# Patient Record
Sex: MALE | Race: WHITE | Employment: FULL TIME | URBAN - NONMETROPOLITAN AREA
[De-identification: names, ages, dates, MRNs, and addresses within clinical notes are randomized per-mention and may not be internally consistent; named-entity substitution may affect disease eponyms.]

---

## 2019-02-06 ENCOUNTER — OFFICE VISIT (OUTPATIENT)
Dept: PSYCHIATRY | Age: 39
End: 2019-02-06
Payer: COMMERCIAL

## 2019-02-06 DIAGNOSIS — F90.2 ATTENTION DEFICIT HYPERACTIVITY DISORDER (ADHD), COMBINED TYPE: Primary | ICD-10-CM

## 2019-02-06 PROCEDURE — 90792 PSYCH DIAG EVAL W/MED SRVCS: CPT | Performed by: PSYCHIATRY & NEUROLOGY

## 2019-02-06 RX ORDER — LEVOCETIRIZINE DIHYDROCHLORIDE 5 MG/1
5 TABLET, FILM COATED ORAL NIGHTLY
COMMUNITY

## 2019-02-06 RX ORDER — M-VIT,TX,IRON,MINS/CALC/FOLIC 27MG-0.4MG
1 TABLET ORAL DAILY
COMMUNITY

## 2019-02-06 RX ORDER — ATOMOXETINE 40 MG/1
80 CAPSULE ORAL DAILY
Qty: 60 CAPSULE | Refills: 2 | Status: SHIPPED | OUTPATIENT
Start: 2019-02-06 | End: 2019-04-29

## 2019-02-20 ENCOUNTER — OFFICE VISIT (OUTPATIENT)
Dept: PSYCHIATRY | Age: 39
End: 2019-02-20
Payer: COMMERCIAL

## 2019-02-20 DIAGNOSIS — F90.0 ATTENTION DEFICIT HYPERACTIVITY DISORDER (ADHD), PREDOMINANTLY INATTENTIVE TYPE: ICD-10-CM

## 2019-02-20 PROBLEM — F90.9 ADHD: Status: ACTIVE | Noted: 2019-02-20

## 2019-02-20 PROCEDURE — 99212 OFFICE O/P EST SF 10 MIN: CPT | Performed by: PSYCHIATRY & NEUROLOGY

## 2019-03-19 ENCOUNTER — OFFICE VISIT (OUTPATIENT)
Dept: PSYCHIATRY | Age: 39
End: 2019-03-19
Payer: COMMERCIAL

## 2019-03-19 DIAGNOSIS — F90.0 ATTENTION DEFICIT HYPERACTIVITY DISORDER (ADHD), PREDOMINANTLY INATTENTIVE TYPE: Primary | ICD-10-CM

## 2019-03-19 PROCEDURE — 99213 OFFICE O/P EST LOW 20 MIN: CPT | Performed by: PSYCHIATRY & NEUROLOGY

## 2019-03-19 RX ORDER — DEXTROAMPHETAMINE SACCHARATE, AMPHETAMINE ASPARTATE, DEXTROAMPHETAMINE SULFATE AND AMPHETAMINE SULFATE 2.5; 2.5; 2.5; 2.5 MG/1; MG/1; MG/1; MG/1
10 TABLET ORAL 2 TIMES DAILY
Qty: 60 TABLET | Refills: 0 | Status: SHIPPED | OUTPATIENT
Start: 2019-03-19 | End: 2019-04-02 | Stop reason: DRUGHIGH

## 2019-04-02 ENCOUNTER — OFFICE VISIT (OUTPATIENT)
Dept: PSYCHIATRY | Age: 39
End: 2019-04-02
Payer: COMMERCIAL

## 2019-04-02 DIAGNOSIS — F90.0 ATTENTION DEFICIT HYPERACTIVITY DISORDER (ADHD), PREDOMINANTLY INATTENTIVE TYPE: ICD-10-CM

## 2019-04-02 PROCEDURE — 99212 OFFICE O/P EST SF 10 MIN: CPT | Performed by: PSYCHIATRY & NEUROLOGY

## 2019-04-02 RX ORDER — DEXTROAMPHETAMINE SACCHARATE, AMPHETAMINE ASPARTATE, DEXTROAMPHETAMINE SULFATE AND AMPHETAMINE SULFATE 5; 5; 5; 5 MG/1; MG/1; MG/1; MG/1
20 TABLET ORAL 2 TIMES DAILY
Qty: 60 TABLET | Refills: 0 | Status: SHIPPED | OUTPATIENT
Start: 2019-04-02 | End: 2019-05-08 | Stop reason: SINTOL

## 2019-04-02 NOTE — PROGRESS NOTES
PSYCHIATRIC FOLLOW UP NOTE     PATIENT NAME: Ayush Montano     : 1980   DATE of VISIT: 2019   No chief complaint on file. Interval History: Today I met with Mr. Carmella Motta in follow up. He discontinued atomoxetine and started adderall. He had no discontinuation symptoms from atomoxetine and notes no effects from the adderall, which he started a week ago. Current meds:   Current Outpatient Medications   Medication Sig Dispense Refill    amphetamine-dextroamphetamine (ADDERALL, 20MG,) 20 MG tablet Take 1 tablet by mouth 2 times daily for 30 days. 60 tablet 0    vitamin D (CHOLECALCIFEROL) 1000 UNIT TABS tablet Take 1,000 Units by mouth daily      Multiple Vitamins-Minerals (THERAPEUTIC MULTIVITAMIN-MINERALS) tablet Take 1 tablet by mouth daily      levocetirizine (XYZAL) 5 MG tablet Take 5 mg by mouth nightly      atomoxetine (STRATTERA) 40 MG capsule Take 2 capsules by mouth daily 60 capsule 2     No current facility-administered medications for this visit. Mental Status Exam:   Appearance: Neatly groomed, appropriately dressed   Gait:wnl  Behavior: Cooperative and Appropriate   Eye Contact: Maintained good eye contact   Psychomotor Activity: Normal   Speech: Rate, volume, and prosody were normal   Mood: Ok   Affect: Full range, appropriate, and mood congruent   Thought Process: Goal directed and coherent   Associations: Goal directed and coherent   Thought content: The thought content was appropriate to questions. The patient denies any suicidal ideation, homicidal ideation, or paranoid ideation.  There are no signs of psychosis or symptoms of mood disorder   Perceptions: Perceptions were normal, the patient denied any auditory, tactile or visual hallucinations   Cognition: Patient is alert and oriented to time, place and person, no gross cognitive deficits   Memory:intact  Attention:fair  Judgment: Judgment appeared normal and appropri ate   Insight: Good insight into illness

## 2019-04-29 ENCOUNTER — OFFICE VISIT (OUTPATIENT)
Dept: PSYCHIATRY | Age: 39
End: 2019-04-29
Payer: COMMERCIAL

## 2019-04-29 DIAGNOSIS — F90.0 ATTENTION DEFICIT HYPERACTIVITY DISORDER (ADHD), PREDOMINANTLY INATTENTIVE TYPE: Primary | ICD-10-CM

## 2019-04-29 PROCEDURE — 99212 OFFICE O/P EST SF 10 MIN: CPT | Performed by: PSYCHIATRY & NEUROLOGY

## 2019-04-29 NOTE — PROGRESS NOTES
normal, the patient denied any auditory, tactile or visual hallucinations   Cognition: Patient is alert and oriented to time, place and person, no gross cognitive deficits   Memory:grossly intact  Attention:ok during interview  Judgment: Judgment appeared normal and appropri ate   Insight: Good insight into illness   Impulse control: Intact     Assessment:   Did not take increased dose of medication regularly enough to assess   Diagnosis:   1. Attention deficit hyperactivity disorder (ADHD), predominantly inattentive type        Plan:    Adderall 20mg bid  Return x 6 weeks

## 2019-05-08 ENCOUNTER — OFFICE VISIT (OUTPATIENT)
Dept: PSYCHIATRY | Age: 39
End: 2019-05-08
Payer: COMMERCIAL

## 2019-05-08 DIAGNOSIS — F41.9 ANXIETY: Primary | ICD-10-CM

## 2019-05-08 DIAGNOSIS — F90.0 ATTENTION DEFICIT HYPERACTIVITY DISORDER (ADHD), PREDOMINANTLY INATTENTIVE TYPE: ICD-10-CM

## 2019-05-08 PROCEDURE — 99213 OFFICE O/P EST LOW 20 MIN: CPT | Performed by: PSYCHIATRY & NEUROLOGY

## 2019-05-08 NOTE — PROGRESS NOTES
PSYCHIATRIC FOLLOW UP NOTE     PATIENT NAME: Kari Ag     : 1980   DATE of VISIT: 2019   No chief complaint on file. Interval History: Today I met with Mr. Shayla Paez in follow up. He came in sooner than planned due to episode on Monday of abdominal pain v anxiety followed by the overwhelming desire to cry. He stopped both the Adderall and the Thrive and is feeling slightly better but continues to feel anxious. He notes that this feeling of anxiety is not new to him, and that he often avoids tasks because the thought of them makes him anxious. Although he did not feel that the adderall was making a difference now that he is off it he can see the difference it was making. For the past 2 days he has had increased difficulty attending to and completing tasks    Current meds:   Current Outpatient Medications   Medication Sig Dispense Refill    vitamin D (CHOLECALCIFEROL) 1000 UNIT TABS tablet Take 1,000 Units by mouth daily      Multiple Vitamins-Minerals (THERAPEUTIC MULTIVITAMIN-MINERALS) tablet Take 1 tablet by mouth daily      levocetirizine (XYZAL) 5 MG tablet Take 5 mg by mouth nightly       No current facility-administered medications for this visit. Mental Status Exam:   Appearance: Neatly groomed, appropriately dressed   Gait:wnl  Behavior: Cooperative and Appropriate   Eye Contact: Maintained good eye contact   Psychomotor Activity: Normal   Speech: Rate, volume, and prosody were normal   Mood: Ok   Affect: Full range, appropriate, and mood congruent   Thought Process: Goal directed and coherent   Associations: Goal directed and coherent   Thought content: The thought content was appropriate to questions. The patient denies any suicidal ideation or homicidal ideation.    Perceptions: Perceptions were normal, the patient denied any auditory, tactile or visual hallucinations   Cognition: Patient is alert and oriented to time, place and person, no gross cognitive deficits Memory:grossly intact  Attention:ok during interview  Judgment: Judgment appeared normal and appropri ate   Insight: Good insight into illness   Impulse control: Intact     Assessment:   Anxiety most probably related to either adderall or thrive   Diagnosis:   1. Anxiety    2.  Attention deficit hyperactivity disorder (ADHD), predominantly inattentive type        Plan:   D/C both adderall and dietary supplement for 2 weeks  If abdominal pain does not resolve will call PCP  Assess for trial of different stimulant at next visit  Return x 2 weeks

## 2019-06-12 ENCOUNTER — OFFICE VISIT (OUTPATIENT)
Dept: PSYCHIATRY | Age: 39
End: 2019-06-12
Payer: COMMERCIAL

## 2019-06-12 DIAGNOSIS — F90.0 ATTENTION DEFICIT HYPERACTIVITY DISORDER (ADHD), PREDOMINANTLY INATTENTIVE TYPE: Primary | ICD-10-CM

## 2019-06-12 DIAGNOSIS — F41.9 ANXIETY DISORDER, UNSPECIFIED TYPE: ICD-10-CM

## 2019-06-12 PROCEDURE — 99214 OFFICE O/P EST MOD 30 MIN: CPT | Performed by: PSYCHIATRY & NEUROLOGY

## 2019-06-12 RX ORDER — METHYLPHENIDATE HYDROCHLORIDE 18 MG/1
18 TABLET ORAL 2 TIMES DAILY
Qty: 60 TABLET | Refills: 0 | Status: SHIPPED | OUTPATIENT
Start: 2019-06-12 | End: 2019-06-13

## 2019-06-12 NOTE — PROGRESS NOTES
PSYCHIATRIC FOLLOW UP NOTE     PATIENT NAME: Mahnaz Henriquez     : 1980   DATE of VISIT: 2019   Chief Complaint   Patient presents with    ADHD          Interval History: Today I met with Mr. Claudette Jules in follow up. He missed his last appointment due to forgetfulness and so \"had time to do an experiment. \" His basement flooded and while working on it became distracted had \"four things going at [email or web address]". He took Adderall and was able to concentrate but also developed stomach pain. When he d/c'd adderall pain resolved. He has restarted Thrive and did not get stomach pain. When he stopped it he had less energy so he restarted that. He also notes that the stomach pain recurs when he is facing a situation he is uncomfortable with. He wonders if the stomach pain also was helped by welbutrin, and notes that he would like to be more comfortable around people. Current meds:   Current Outpatient Medications   Medication Sig Dispense Refill    methylphenidate (CONCERTA) 18 MG extended release tablet Take 1 tablet by mouth 2 times daily for 30 days. 60 tablet 0    vitamin D (CHOLECALCIFEROL) 1000 UNIT TABS tablet Take 1,000 Units by mouth daily      Multiple Vitamins-Minerals (THERAPEUTIC MULTIVITAMIN-MINERALS) tablet Take 1 tablet by mouth daily      levocetirizine (XYZAL) 5 MG tablet Take 5 mg by mouth nightly       No current facility-administered medications for this visit. Mental Status Exam:   Appearance: Neatly groomed, appropriately dressed   Gait:wnl  Behavior: Cooperative and Appropriate   Eye Contact: Maintained good eye contact   Psychomotor Activity: Normal   Speech: Rate, volume, and prosody were normal   Mood: Ok   Affect: Full range, appropriate, and mood congruent   Thought Process: Goal directed and coherent   Associations: Goal directed and coherent   Thought content: The thought content was appropriate to questions. The patient denies any suicidal ideation or homicidal ideation.

## 2019-06-13 DIAGNOSIS — F90.0 ATTENTION DEFICIT HYPERACTIVITY DISORDER, INATTENTIVE TYPE: Primary | ICD-10-CM

## 2019-06-13 RX ORDER — METHYLPHENIDATE HYDROCHLORIDE 18 MG/1
18 TABLET, EXTENDED RELEASE ORAL DAILY
Qty: 30 TABLET | Refills: 0 | Status: SHIPPED | OUTPATIENT
Start: 2019-06-13 | End: 2019-07-10 | Stop reason: DRUGHIGH

## 2019-06-13 NOTE — TELEPHONE ENCOUNTER
[facility] has denied [facility]'s Concerta 10VX for 2 tablets daily due to it exceeding the max quantity allowed. They has notified this office that it will not approve more than:    One daily dose of an ER product,   One daily dose each of an ER and an IR, when used in combination,  2 daily doses of IR, without concurrent use of an ER product. Please advise on how you would like to proceed? I have attached the denial paperwork to this encounter.

## 2019-06-13 NOTE — TELEPHONE ENCOUNTER
Medication has been loaded for Concerta 15JZ (once daily) #30 with 0 refills with the DX Code pending your approval,

## 2019-06-13 NOTE — TELEPHONE ENCOUNTER
I spoke with Latricia Smith. Please reload the prescription for 18mg 1 tablet daily #30. He will try either one a day or 2 a day as we had planned and I am seeing him in 2 weeks to adjust the dose.  Thank you

## 2019-07-10 ENCOUNTER — OFFICE VISIT (OUTPATIENT)
Dept: PSYCHIATRY | Age: 39
End: 2019-07-10
Payer: COMMERCIAL

## 2019-07-10 DIAGNOSIS — F90.0 ATTENTION DEFICIT HYPERACTIVITY DISORDER, INATTENTIVE TYPE: ICD-10-CM

## 2019-07-10 DIAGNOSIS — F41.9 ANXIETY: Primary | ICD-10-CM

## 2019-07-10 PROCEDURE — 99213 OFFICE O/P EST LOW 20 MIN: CPT | Performed by: PSYCHIATRY & NEUROLOGY

## 2019-07-10 RX ORDER — METHYLPHENIDATE HYDROCHLORIDE 36 MG/1
72 TABLET ORAL DAILY
Qty: 60 TABLET | Refills: 0 | Status: SHIPPED | OUTPATIENT
Start: 2019-07-10 | End: 2019-07-19

## 2019-07-18 DIAGNOSIS — F90.0 ATTENTION DEFICIT HYPERACTIVITY DISORDER, INATTENTIVE TYPE: Primary | ICD-10-CM

## 2019-07-19 RX ORDER — METHYLPHENIDATE HYDROCHLORIDE 72 MG/1
72 TABLET, EXTENDED RELEASE ORAL EVERY MORNING
Qty: 30 TABLET | Refills: 0 | Status: SHIPPED | OUTPATIENT
Start: 2019-07-19 | End: 2019-07-30

## 2019-07-19 NOTE — TELEPHONE ENCOUNTER
Medication is pending your approval for Relexxi 72mg ER with instructions to take 1 tablet every morning;#30 with 0 refills.

## 2019-07-19 NOTE — TELEPHONE ENCOUNTER
Thank you. How would you like that medication loaded for the dose? I will notify patient once this medication is sent.

## 2019-07-30 ENCOUNTER — OFFICE VISIT (OUTPATIENT)
Dept: PSYCHIATRY | Age: 39
End: 2019-07-30
Payer: COMMERCIAL

## 2019-07-30 DIAGNOSIS — F32.A DEPRESSIVE DISORDER: Primary | ICD-10-CM

## 2019-07-30 DIAGNOSIS — F90.0 ATTENTION DEFICIT HYPERACTIVITY DISORDER (ADHD), PREDOMINANTLY INATTENTIVE TYPE: ICD-10-CM

## 2019-07-30 PROCEDURE — 99214 OFFICE O/P EST MOD 30 MIN: CPT | Performed by: PSYCHIATRY & NEUROLOGY

## 2019-07-30 RX ORDER — BUPROPION HYDROCHLORIDE 150 MG/1
300 TABLET ORAL EVERY MORNING
Qty: 60 TABLET | Refills: 1 | Status: SHIPPED | OUTPATIENT
Start: 2019-07-30 | End: 2019-10-11 | Stop reason: SDUPTHER

## 2019-10-11 RX ORDER — BUPROPION HYDROCHLORIDE 150 MG/1
300 TABLET ORAL EVERY MORNING
Qty: 60 TABLET | Refills: 0 | Status: SHIPPED | OUTPATIENT
Start: 2019-10-11

## 2019-10-30 ENCOUNTER — OFFICE VISIT (OUTPATIENT)
Dept: PSYCHIATRY | Age: 39
End: 2019-10-30
Payer: COMMERCIAL

## 2019-10-30 DIAGNOSIS — F90.0 ATTENTION DEFICIT HYPERACTIVITY DISORDER (ADHD), PREDOMINANTLY INATTENTIVE TYPE: ICD-10-CM

## 2019-10-30 DIAGNOSIS — F32.A DEPRESSION, UNSPECIFIED DEPRESSION TYPE: Primary | ICD-10-CM

## 2019-10-30 PROCEDURE — 99213 OFFICE O/P EST LOW 20 MIN: CPT | Performed by: PSYCHIATRY & NEUROLOGY
